# Patient Record
Sex: MALE | Race: WHITE | Employment: FULL TIME | URBAN - METROPOLITAN AREA
[De-identification: names, ages, dates, MRNs, and addresses within clinical notes are randomized per-mention and may not be internally consistent; named-entity substitution may affect disease eponyms.]

---

## 2020-11-16 ENCOUNTER — TELEPHONE (OUTPATIENT)
Dept: FAMILY MEDICINE CLINIC | Facility: CLINIC | Age: 65
End: 2020-11-16

## 2020-11-16 ENCOUNTER — HOSPITAL ENCOUNTER (OUTPATIENT)
Age: 65
Discharge: HOME | End: 2020-11-16
Attending: EMERGENCY MEDICINE
Payer: COMMERCIAL

## 2020-11-16 VITALS
TEMPERATURE: 99 F | SYSTOLIC BLOOD PRESSURE: 112 MMHG | OXYGEN SATURATION: 97 % | DIASTOLIC BLOOD PRESSURE: 61 MMHG | RESPIRATION RATE: 22 BRPM | HEART RATE: 77 BPM

## 2020-11-16 DIAGNOSIS — J06.9 UPPER RESPIRATORY TRACT INFECTION, UNSPECIFIED TYPE: Primary | ICD-10-CM

## 2020-11-16 PROCEDURE — 99203 OFFICE O/P NEW LOW 30 MIN: CPT

## 2020-11-16 NOTE — ED INITIAL ASSESSMENT (HPI)
Pt here w/ c/o pain to throat when he takes a deep breath and makes him cough. Slight dry cough noted.  Onset 4-5 days

## 2020-11-16 NOTE — ED PROVIDER NOTES
Patient Seen in: [facility]      History   Patient presents with:  Cough/URI    Stated Complaint: sent from his dr/ feels like something is stuck in his throat and it makes him *    HPI    Patient is a 49-year-old male who states for the Temp src Temporal   SpO2 97 %   O2 Device None (Room air)       Current:/61   Pulse 77   Temp 99.2 °F (37.3 °C) (Temporal)   Resp 22   SpO2 97%         Physical Exam  GENERAL: Patient resting comfortably on the cart in no acute distress.   HEENT: Ex

## 2020-11-16 NOTE — TELEPHONE ENCOUNTER
Per Keiko Lewis, please triage. We do not know what patients symptoms are and if he is c/o any SOB, he needs to go to IC. If not, OK for virtual visit this afternoon.

## 2020-11-16 NOTE — TELEPHONE ENCOUNTER
I called patient and made him aware of Kadeem Simpson advise to to to [facility]. He asked that I send the address via [employer] to the [facility] location-->sent address via [employer]. He is going now to the [facility].  Patient verbalized

## 2020-11-16 NOTE — TELEPHONE ENCOUNTER
Patient came into office for physical- informed the  staff he was having COVID symptoms and his visit was changed to a virtual visit.  I used language lines solutions  Arnulfo Peña #[id number] and called his home phone, cell phone and emerge

## 2020-11-16 NOTE — TELEPHONE ENCOUNTER
Given his symptoms and stating \"when he takes deep breaths in he feel like his throat closes like if something is stuck\" I would like him to go to  [facility] for an evaluation. We can have him establish care/follow up thereafter.

## 2020-11-16 NOTE — TELEPHONE ENCOUNTER
I spoke with patient (Uzbek) and he said he presented in clinic for an evaluation of his shoulder, after he was screened for Covid symptoms he was told he would have a virtual visit.     Date of ONSET: 4 days ago 11/12/2020    Since onset/CURRENTLY:    Co

## 2020-11-18 ENCOUNTER — TELEPHONE (OUTPATIENT)
Dept: FAMILY MEDICINE CLINIC | Facility: CLINIC | Age: 65
End: 2020-11-18

## 2020-11-18 NOTE — TELEPHONE ENCOUNTER
Per Tacos Sargent: Pt will be establishing care in our office--he came into the office to see Cordell on 11/16/2020 but was sent back to his car as he had sick symptoms. He was triaged by our office and sent to [facility] due to his symptoms. He has tested positive for COVID. Please add him to the [facility] call list and schedule virtual appointment with Cordell 11/23/2020. To ER if any new/worsening symptoms.

## 2020-11-19 NOTE — TELEPHONE ENCOUNTER
65 nay DO requested call to pt's spouse/paolo today for condition update. sts An Maddox speaks [address] well. Call to pt's home reaches identified voice mail. Since no hipaa consent seen in record, left [facility] req call back tomorrow w update. Provided phone hours.

## 2020-11-23 ENCOUNTER — TELEMEDICINE (OUTPATIENT)
Dept: FAMILY MEDICINE CLINIC | Facility: CLINIC | Age: 65
End: 2020-11-23
Payer: COMMERCIAL

## 2020-11-23 ENCOUNTER — APPOINTMENT (OUTPATIENT)
Dept: GENERAL RADIOLOGY | Facility: HOSPITAL | Age: 65
DRG: 177 | End: 2020-11-23
Attending: EMERGENCY MEDICINE
Payer: COMMERCIAL

## 2020-11-23 ENCOUNTER — HOSPITAL ENCOUNTER (INPATIENT)
Facility: HOSPITAL | Age: 65
LOS: 3 days | Discharge: HOME | DRG: 177 | End: 2020-11-26
Attending: EMERGENCY MEDICINE | Admitting: INTERNAL MEDICINE
Payer: COMMERCIAL

## 2020-11-23 ENCOUNTER — TELEPHONE (OUTPATIENT)
Dept: FAMILY MEDICINE CLINIC | Facility: CLINIC | Age: 65
End: 2020-11-23

## 2020-11-23 DIAGNOSIS — R09.02 OXYGEN DESATURATION: ICD-10-CM

## 2020-11-23 DIAGNOSIS — R09.02 HYPOXIA: ICD-10-CM

## 2020-11-23 DIAGNOSIS — R05.9 COUGH: ICD-10-CM

## 2020-11-23 DIAGNOSIS — U07.1 COVID-19 VIRUS INFECTION: Primary | ICD-10-CM

## 2020-11-23 DIAGNOSIS — R50.9 FEVER, UNSPECIFIED FEVER CAUSE: ICD-10-CM

## 2020-11-23 PROBLEM — E87.6 HYPOKALEMIA: Status: ACTIVE | Noted: 2020-11-23

## 2020-11-23 PROCEDURE — 71045 X-RAY EXAM CHEST 1 VIEW: CPT | Performed by: EMERGENCY MEDICINE

## 2020-11-23 PROCEDURE — 3E0333Z INTRODUCTION OF ANTI-INFLAMMATORY INTO PERIPHERAL VEIN, PERCUTANEOUS APPROACH: ICD-10-PCS | Performed by: HOSPITALIST

## 2020-11-23 PROCEDURE — 99223 1ST HOSP IP/OBS HIGH 75: CPT | Performed by: HOSPITALIST

## 2020-11-23 PROCEDURE — 99214 OFFICE O/P EST MOD 30 MIN: CPT | Performed by: NURSE PRACTITIONER

## 2020-11-23 RX ORDER — ACETAMINOPHEN 325 MG/1
325 TABLET ORAL EVERY 6 HOURS PRN
COMMUNITY
End: 2020-11-27

## 2020-11-23 RX ORDER — ONDANSETRON 2 MG/ML
4 INJECTION INTRAMUSCULAR; INTRAVENOUS EVERY 6 HOURS PRN
Status: DISCONTINUED | OUTPATIENT
Start: 2020-11-23 | End: 2020-11-26

## 2020-11-23 RX ORDER — ACETAMINOPHEN 325 MG/1
650 TABLET ORAL EVERY 6 HOURS PRN
Status: DISCONTINUED | OUTPATIENT
Start: 2020-11-23 | End: 2020-11-26

## 2020-11-23 RX ORDER — ENOXAPARIN SODIUM 100 MG/ML
40 INJECTION SUBCUTANEOUS DAILY
Status: DISCONTINUED | OUTPATIENT
Start: 2020-11-24 | End: 2020-11-26

## 2020-11-23 RX ORDER — DEXAMETHASONE SODIUM PHOSPHATE 4 MG/ML
6 VIAL (ML) INJECTION ONCE
Status: COMPLETED | OUTPATIENT
Start: 2020-11-23 | End: 2020-11-23

## 2020-11-23 NOTE — CONSULTS
INFECTIOUS [facility] [address]. Patient Status:  Emergency    1955 MRN [id number]   Location [address] Attending No att. providers found   Hosp Day # 0 [18-25] 25  BP: ()/(54-74) 107/74  HEENT: Moist mucous membranes. Extraocular muscles are intact. Neck: No lymphadenopathy. supple, no masses  Respiratory: non labored  Abdomen: Soft, nontender, nondistended.     Musculoskeletal: Full range of motion respiratory failure, but has mild hypoxia  No objection to brief course of steroids          Alexa Khan MD  [facility]  [phone]

## 2020-11-23 NOTE — PROGRESS NOTES
Subjective     HPI:   Rosa Pa verbally consents to a Virtual/Telephone Check-In service on 11/23/20. Patient understands and accepts financial responsibility for any deductible, co-insurance and/or co-pays associated with this service.  This visit is emergency and because of restrictions of visitation. There are limitations of this visit as a limited physical exam could be performed. Every conscious effort was taken to allow for sufficient and adequate time.  Time was spent on reviewing medications and

## 2020-11-23 NOTE — H&P
[facility] H[facility]PITAL[facility]  History and Physical     Beth White Patient Status:  Emergency    1955 MRN [id number]   Location [address] Attending No att. providers found   Hosp Day # 0 PCP Ratna Chavez MD     Chief Comp atraumatic. Moist mucous membranes. EOM-I. PERRLA. Anicteric. Neck: No lymphadenopathy. No JVD. No carotid bruits. Respiratory: Clear to auscultation bilaterally. No wheezes. No rhonchi. Cardiovascular: S1, S2. Regular rate and rhythm.  No murmurs, rubs

## 2020-11-23 NOTE — ED PROVIDER NOTES
Patient Seen in: [facility]      History   Patient presents with:  Covid  Difficulty Breathing    Stated Complaint: covid pos low o2 sat and fever    HPI    57-year-old male comes to the hospital complaint of having difficulty w no JVD, trachea midline, No LAD  Heart: S1S2 normal. No murmurs, regular rate and rhythm  Lungs: Clear to auscultation bilaterally, increase expiratory rate noted  Abdomen: Soft nontender nondistended normal active bowel sounds without rebound, guarding or WITH DIFFERENTIAL WITH PLATELET.   Procedure                               Abnormality         Status                     ---------                               -----------         ------                     CBC W/ DIFFERENTIAL[[id number]]          Abnormal involving complex record review, complex medical decisions and interventions, and consultations outside the regular procedures and care normally rendered for greater then 35 minutes of critical care time                Disposition and Plan     Clinical Imp

## 2020-11-23 NOTE — ED NOTES
Patient's SPO2 is 85% on room air and he has labored breathing that is fast. Patient is placed on nasal cannula at 3 lpm and breathing effort has eased.  Spo2 is now 94%

## 2020-11-23 NOTE — TELEPHONE ENCOUNTER
1254 per nay NP-had doximity visit w pt today. Pt now has pulse ox and thermometer. Reporting O2 sats in the 80s and temp 102-advised to proceed to ER now. Requests call to ER advsing of this info.   Call to thang DIAZ-spoke edis palm RN-advised pt shahana

## 2020-11-24 PROCEDURE — 99232 SBSQ HOSP IP/OBS MODERATE 35: CPT | Performed by: HOSPITALIST

## 2020-11-24 RX ORDER — MAGNESIUM HYDROXIDE/ALUMINUM HYDROXICE/SIMETHICONE 120; 1200; 1200 MG/30ML; MG/30ML; MG/30ML
30 SUSPENSION ORAL 4 TIMES DAILY PRN
Status: DISCONTINUED | OUTPATIENT
Start: 2020-11-24 | End: 2020-11-26

## 2020-11-24 NOTE — PROGRESS NOTES
Pt just arrived from ER, alert and oriented,  was used to communicate with pt. Denies pain. 02 3L NC. RA 85%,  Decadron po.  Will monitor pt

## 2020-11-24 NOTE — PROGRESS NOTES
Pt is alert and oriented x4, mainly Slovak speaking, pt uses  jace on his phone to help with communication. IS 02 3 L NC, . Tele NSR. VSS, decadron po, up self. Voids. Droplet and contact isolation in place. Safety measures in place.  Tommie zazueta

## 2020-11-24 NOTE — PAYOR COMM NOTE
--------------  ADMISSION REVIEW     Payor: ALFRED [employer]  Subscriber #:  [id number]  Authorization Number: [id number]    Admit date: 11/23/20  Admit time: 1811       Patient Seen in: [facility] Emergency Department    History   Stated Complaint: covid p Soft nontender nondistended normal active bowel sounds without rebound, guarding or masses noted  Back nontender without CVA tenderness  Extremity no clubbing, cyanosis or edema noted.   Full range of motion noted without tenderness  Neuro: No focal deficit further care for his Covid infection with hypoxia    Disposition and Plan     Clinical Impression:  COVID-19 virus infection  (primary encounter diagnosis)  Hypoxia        [facility]  History and Physical     History of Present Illness: Tia Armenta 11/16  -shortness of breath, mild hypoxia 02 sat 91% RA, on 2L     Not likely to get significant benefit from Remdesivir at 14 days of symptoms and only mild hypoxia     No signs of respiratory failure, but has mild hypoxia  No objection to brief course of (Left Lower Abdomen) Ze Price, RN      potassium chloride 40 mEq in sodium chloride 0.9% 250 mL IVPB     Date Action Dose Route User    11/23/2020 1955 New Bag 40 mEq Intravenous Fabienne Acuña RN      sodium chloride 0.9% IV bolus 1,000 mL     Da

## 2020-11-24 NOTE — PROGRESS NOTES
[facility]ITAL[facility]  Progress Note     Beto [place] Patient Status:  Inpatient    1955 MRN [id number]   [facility] 5NW-A Attending Amaya Reynaga MD   Hosp Day # 1 PCP Jayne Castañeda MD     Chief Complaint: SOB    S: Patient seen Imaging data reviewed in Epic. Medications:   • enoxaparin  40 mg Subcutaneous Daily   • dexamethasone  6 mg Oral Daily       ASSESSMENT / PLAN:     1. COVID-19 pneumonia-patient on day 14 of onset of symptoms.   We will continue on Decadron D2 with te

## 2020-11-24 NOTE — COVID NURSING ASSESSMENT
COVID-19 Daily Discharge Readiness-Nursing    Temperature max from last 24 hrs: Temp (24hrs), Av.8 °F (37.1 °C), Min:98.5 °F (36.9 °C), Max:99 °F (37.2 °C)    Inflammatory Markers:   Recent Labs   Lab 20  1443   CRP 17.30*   AURA 2,203.0*   DDIMER

## 2020-11-24 NOTE — PROGRESS NOTES
[address] [place] [address] Patient Status:  Inpatient    1955 MRN [id number]   [facility] 5NW-A Attending Candace Camargo MD   [facility] COVID-19 virus infection     Hypoxia     Hypokalemia        ASSESSMENT/PLAN:  1.  COVID-19 pneumonia day #15  Onset of symptoms 11/9, and tested positive on 11/16  -shortness of breath, mild hypoxia 02 sat 91% RA, on 2L     Not likely to get significant boogie

## 2020-11-25 PROCEDURE — 99232 SBSQ HOSP IP/OBS MODERATE 35: CPT | Performed by: HOSPITALIST

## 2020-11-25 NOTE — PLAN OF CARE
COVID-19 Daily Discharge Readiness-Nursing    O2 Sat at Rest:   95  %   O2 Sat with Exertion:    % on    liters   Temperature max from last 24 hrs: Temp (24hrs), Av °F (36.7 °C), Min:97.7 °F (36.5 °C), Max:98.5 °F (36.9 °C)    Inflammatory Markers:   R non-pharmacological measures as appropriate and evaluate response  - Consider cultural and social influences on pain and pain management  - Manage/alleviate anxiety  - Utilize distraction and/or relaxation techniques  - Monitor for opioid side effects  - N Refer to Case Management Department for coordinating discharge planning if the patient needs post-hospital services based on physician/LIP order or complex needs related to functional status, cognitive ability or social support system  Outcome: Progressing

## 2020-11-25 NOTE — PROGRESS NOTES
Pt is a 71 y/o male admitted with acute resp failure due to covid 19 infection. alert oriented. Beninese speaking. placed him on room air.02 sats>95%. up as tolerated. encouraged IS and self proning. inflmatory markers trending up but CRP down. pending  02 walk. no

## 2020-11-25 NOTE — PROGRESS NOTES
11/24/20 1934   Provider Notification   Reason for Communication Patient request   Provider Name Other (comment)  Lori Asim)   Method of Communication Page   Response Waiting for response   Notification Time 1935   MD ordered Maalox.

## 2020-11-25 NOTE — PROGRESS NOTES
[address] [place] [address] Patient Status:  Inpatient    1955 MRN [id number]   [facility] 5NW-A Attending Ibrahima Olmedo MD   [facility]osp Day # 2 [facility]gastroes[facility] disease)     Pterygium eye, bilateral     Nuclear sclerotic cataract of both eyes     COVID-19 virus infection     Hypoxia     Hypokalemia        ASSESSMENT/PLAN:  1.  COVID-19 pneumonia day #16  Onset of symptoms 11/9, and tested p

## 2020-11-25 NOTE — PROGRESS NOTES
[facility]  Progress Note     Dyana Tonia Patient Status:  Inpatient    1955 MRN [id number]   [facility] 5NW-A Attending Tobias Cummins MD   Hosp Day # 2 PCP Vladimir Shaikh MD     Chief Complaint: SOB    S:no complaints, Results   Component Value Date    PCT 0.13 11/23/2020        Troponin  Lab Results   Component Value Date    TROP <0.045 11/23/2020    TROP <0.045 11/23/2020    TROP <0.045 11/23/2020       Pro-BNP  Lab Results   Component Value Date    PBNP 127 (H) 11/23/

## 2020-11-25 NOTE — PAYOR COMM NOTE
--------------  11/25 CONTINUED STAY REVIEW    Payor: [employer]FRED [employer]  Subscriber #:  [id number]  Authorization Number: [id number]       HOSPITALIST:    S: no complaints, feeling better      Vital signs:  Temp:  [97.7 °F (36.5 °C)-98.5 °F (36.9 °C)] 98 °F (36.7 suspension 30 mL     Date Action Dose Route User    11/24/2020 1954 Given 30 mL Oral Xiang Harding, RN      Enoxaparin Sodium (LOVENOX) 40 MG/0.4ML injection 40 mg     Date Action Dose Route User    11/24/2020 0820 Given 40 mg Subcutaneous (Left Lower

## 2020-11-26 VITALS
BODY MASS INDEX: 26.26 KG/M2 | RESPIRATION RATE: 16 BRPM | WEIGHT: 167.31 LBS | SYSTOLIC BLOOD PRESSURE: 103 MMHG | HEIGHT: 67 IN | TEMPERATURE: 98 F | DIASTOLIC BLOOD PRESSURE: 66 MMHG | HEART RATE: 54 BPM | OXYGEN SATURATION: 92 %

## 2020-11-26 PROCEDURE — 99239 HOSP IP/OBS DSCHRG MGMT >30: CPT | Performed by: HOSPITALIST

## 2020-11-26 RX ORDER — DEXAMETHASONE 6 MG/1
6 TABLET ORAL DAILY
Qty: 2 TABLET | Refills: 0 | Status: SHIPPED | OUTPATIENT
Start: 2020-11-27 | End: 2020-11-30

## 2020-11-26 NOTE — PROGRESS NOTES
[facility]ITAL[facility]  Progress Note     Patricia Sophie Patient Status:  Inpatient    1955 MRN [id number]   [facility] 5NW-A Attending Brijesh Hampton MD   Hosp Day # 3 PCP Elizabet Navarro MD     Chief Complaint: SOB    S:no complaints, Results   Component Value Date    PCT 0.13 11/23/2020        Troponin  Lab Results   Component Value Date    TROP <0.045 11/23/2020    TROP <0.045 11/23/2020    TROP <0.045 11/23/2020       Pro-BNP  Lab Results   Component Value Date    PBNP 127 (H) 11/23/

## 2020-11-26 NOTE — PROGRESS NOTES
NURSING DISCHARGE NOTE    Discharged Home via Wheelchair. Accompanied by Support staff  Belongings Taken by patient/family   IV d/c. D/C paperwork given to pt and discussed with pt- verbalized understanding.  Updated pt's daughters on d/c orders- saray

## 2020-11-26 NOTE — PLAN OF CARE
COVID-19 Daily Discharge Readiness-Nursing    O2 Sat at Rest:    95 %   O2 Sat with Exertion:    90 % on    0.5 liters   Temperature max from last 24 hrs: Temp (24hrs), Av.1 °F (36.7 °C), Min:97.8 °F (36.6 °C), Max:98.5 °F (36.9 °C)    Inflammatory Mar appropriate and evaluate response  - Consider cultural and social influences on pain and pain management  - Manage/alleviate anxiety  - Utilize distraction and/or relaxation techniques  - Monitor for opioid side effects  - Notify MD/LIP if interventions un for coordinating discharge planning if the patient needs post-hospital services based on physician/LIP order or complex needs related to functional status, cognitive ability or social support system  Outcome: Progressing     Problem: RESPIRATORY - ADULT  G

## 2020-11-27 ENCOUNTER — TELEPHONE (OUTPATIENT)
Dept: FAMILY MEDICINE CLINIC | Facility: CLINIC | Age: 65
End: 2020-11-27

## 2020-11-27 ENCOUNTER — HOSPITAL ENCOUNTER (INPATIENT)
Facility: HOSPITAL | Age: 65
LOS: 1 days | Discharge: HOME | DRG: 177 | End: 2020-11-28
Attending: EMERGENCY MEDICINE | Admitting: HOSPITALIST
Payer: COMMERCIAL

## 2020-11-27 ENCOUNTER — APPOINTMENT (OUTPATIENT)
Dept: CT IMAGING | Facility: HOSPITAL | Age: 65
DRG: 177 | End: 2020-11-27
Attending: HOSPITALIST
Payer: COMMERCIAL

## 2020-11-27 ENCOUNTER — APPOINTMENT (OUTPATIENT)
Dept: GENERAL RADIOLOGY | Facility: HOSPITAL | Age: 65
DRG: 177 | End: 2020-11-27
Attending: EMERGENCY MEDICINE
Payer: COMMERCIAL

## 2020-11-27 DIAGNOSIS — U07.1 COVID-19 VIRUS INFECTION: ICD-10-CM

## 2020-11-27 DIAGNOSIS — R09.02 HYPOXIA: Primary | ICD-10-CM

## 2020-11-27 PROCEDURE — 71045 X-RAY EXAM CHEST 1 VIEW: CPT | Performed by: EMERGENCY MEDICINE

## 2020-11-27 PROCEDURE — 3E0333Z INTRODUCTION OF ANTI-INFLAMMATORY INTO PERIPHERAL VEIN, PERCUTANEOUS APPROACH: ICD-10-PCS | Performed by: HOSPITALIST

## 2020-11-27 PROCEDURE — 71275 CT ANGIOGRAPHY CHEST: CPT | Performed by: HOSPITALIST

## 2020-11-27 PROCEDURE — 99223 1ST HOSP IP/OBS HIGH 75: CPT | Performed by: HOSPITALIST

## 2020-11-27 RX ORDER — DEXAMETHASONE SODIUM PHOSPHATE 4 MG/ML
6 VIAL (ML) INJECTION EVERY 24 HOURS
Status: DISCONTINUED | OUTPATIENT
Start: 2020-11-27 | End: 2020-11-28

## 2020-11-27 RX ORDER — SIMETHICONE 80 MG
80 TABLET,CHEWABLE ORAL 4 TIMES DAILY PRN
Status: DISCONTINUED | OUTPATIENT
Start: 2020-11-27 | End: 2020-11-28

## 2020-11-27 RX ORDER — MELATONIN
3 NIGHTLY PRN
Status: DISCONTINUED | OUTPATIENT
Start: 2020-11-27 | End: 2020-11-28

## 2020-11-27 RX ORDER — HYDROCODONE BITARTRATE AND ACETAMINOPHEN 5; 325 MG/1; MG/1
1 TABLET ORAL EVERY 4 HOURS PRN
Status: DISCONTINUED | OUTPATIENT
Start: 2020-11-27 | End: 2020-11-28

## 2020-11-27 RX ORDER — MAGNESIUM HYDROXIDE/ALUMINUM HYDROXICE/SIMETHICONE 120; 1200; 1200 MG/30ML; MG/30ML; MG/30ML
30 SUSPENSION ORAL 4 TIMES DAILY PRN
Status: DISCONTINUED | OUTPATIENT
Start: 2020-11-27 | End: 2020-11-28

## 2020-11-27 RX ORDER — ALBUTEROL SULFATE 90 UG/1
8 AEROSOL, METERED RESPIRATORY (INHALATION) 4 TIMES DAILY
Status: DISCONTINUED | OUTPATIENT
Start: 2020-11-27 | End: 2020-11-28

## 2020-11-27 RX ORDER — CALCIUM CARBONATE 200(500)MG
500 TABLET,CHEWABLE ORAL 3 TIMES DAILY PRN
Status: DISCONTINUED | OUTPATIENT
Start: 2020-11-27 | End: 2020-11-28

## 2020-11-27 RX ORDER — ECHINACEA PURPUREA EXTRACT 125 MG
1 TABLET ORAL
Status: DISCONTINUED | OUTPATIENT
Start: 2020-11-27 | End: 2020-11-28

## 2020-11-27 RX ORDER — TRAZODONE HYDROCHLORIDE 50 MG/1
50 TABLET ORAL NIGHTLY PRN
Status: DISCONTINUED | OUTPATIENT
Start: 2020-11-27 | End: 2020-11-28

## 2020-11-27 RX ORDER — SENNOSIDES 8.6 MG
8.6 TABLET ORAL 2 TIMES DAILY PRN
Status: DISCONTINUED | OUTPATIENT
Start: 2020-11-27 | End: 2020-11-28

## 2020-11-27 RX ORDER — ENOXAPARIN SODIUM 100 MG/ML
40 INJECTION SUBCUTANEOUS EVERY EVENING
Status: DISCONTINUED | OUTPATIENT
Start: 2020-11-27 | End: 2020-11-28

## 2020-11-27 RX ORDER — HYDROCODONE BITARTRATE AND ACETAMINOPHEN 5; 325 MG/1; MG/1
2 TABLET ORAL EVERY 4 HOURS PRN
Status: DISCONTINUED | OUTPATIENT
Start: 2020-11-27 | End: 2020-11-28

## 2020-11-27 RX ORDER — BENZONATATE 100 MG/1
100 CAPSULE ORAL 3 TIMES DAILY PRN
Status: DISCONTINUED | OUTPATIENT
Start: 2020-11-27 | End: 2020-11-28

## 2020-11-27 RX ORDER — ACETAMINOPHEN 325 MG/1
650 TABLET ORAL EVERY 6 HOURS PRN
Status: DISCONTINUED | OUTPATIENT
Start: 2020-11-27 | End: 2020-11-28

## 2020-11-27 RX ORDER — ONDANSETRON 2 MG/ML
8 INJECTION INTRAMUSCULAR; INTRAVENOUS EVERY 6 HOURS PRN
Status: DISCONTINUED | OUTPATIENT
Start: 2020-11-27 | End: 2020-11-28

## 2020-11-27 RX ORDER — POLYETHYLENE GLYCOL 3350 17 G/17G
17 POWDER, FOR SOLUTION ORAL DAILY PRN
Status: DISCONTINUED | OUTPATIENT
Start: 2020-11-27 | End: 2020-11-28

## 2020-11-27 RX ORDER — BISACODYL 10 MG
10 SUPPOSITORY, RECTAL RECTAL
Status: DISCONTINUED | OUTPATIENT
Start: 2020-11-27 | End: 2020-11-28

## 2020-11-27 NOTE — DISCHARGE SUMMARY
[facility]OSPITAL[facility]  DISCHARGE SUMMARY     Neftali Zavala Patient Status:  Inpatient    1955 MRN [id number]   [facility] 5NW-A Attending No att. providers found   Hosp Day # 3 PCP Caridad Mullins MD     Date of Admission: 2020 [place] [address]    Phone: [phone]   · dexamethasone 6 MG Tabs         [place]    Follow-up appointment:   MD Liz Astudillo 66 [address] [facility] [phone]    In 1 week      Appointments for Next 30

## 2020-11-27 NOTE — PAYOR COMM NOTE
--------------  DISCHARGE REVIEW    Payor: [facility]FR[facility]  Subscriber #:  [id number]  Authorization Number: [id number]    Admit date: 11/23/20  Admit time:  1811  Discharge Date: 11/26/2020  2:07 PM     Admitting Physician: Anoop Barragan MD  Attending Physici   CRP 14.60 (H) 11/24/2020         Ferritin        Lab Results   Component Value Date     AURA 2,255.6 (H) 11/26/2020     AURA 3,337.6 (H) 11/25/2020     AURA 2,039.1 (H) 11/24/2020         CPK        Lab Results   Component Value Date      11/23/2020

## 2020-11-27 NOTE — TELEPHONE ENCOUNTER
COVID + on: 11/16/2020    Date of ONSET: about 11/11-11/12  (see chart review-->notes--> 11/16/2020)    Patient was [place] [facility] [place] and discharged 11/26/2020    CURRENTLY:    Cough: \"only when speaking a lot\".     SOB: with speaking, can a

## 2020-11-27 NOTE — H&P
[facility]ITAL[facility]  History and Physical     Jessenia Blackman Patient Status:  Emergency    1955 MRN [id number]   [address] Attending Arabella Severs, MD   [facility] Day # 0 PCP Hua Posada MD     Chief Complai Pulse 72   Temp 97 °F (36.1 °C) (Temporal)   Resp 18   Ht 5' 7\" (1.702 m)   Wt 174 lb (78.9 kg)   SpO2 95%   BMI 27.25 kg/m²   General: No acute distress. Alert and oriented x 3. HEENT: Normocephalic atraumatic. Moist mucous membranes. EOM-I. PERRLA.  Ani protocol; low threshold for ID or pulm  Will do med rec once review complete  Quality:  · DVT Prophylaxis: lovenox  · CODE status: full  · Paula: no  · If COVID testing is negative, may discontinue isolation: n/a     Plan of care discussed with patient and

## 2020-11-27 NOTE — TELEPHONE ENCOUNTER
Per nay NP-[facility] admit 11/23 covid+ 11/16. Requests covid home monitoring calls-starting today. **pt speaks Iraqi**  Per hosp record-admit 11/23/2020, discharge 11/26/2020  ASSESSMENT / PLAN:   1.  COVID-19 pneumonia  1. Decadron D4  2.  Wean

## 2020-11-27 NOTE — ED INITIAL ASSESSMENT (HPI)
A/o x3, Haitian speaking, states the wellness nurse called and told him to come to ER because he was sounding short of breath when speaking, pt states he feels a little of SENTHIL on exertion. Lightheaded when walking also.  Was recently admitted for covid-19

## 2020-11-27 NOTE — ED PROVIDER NOTES
Patient Seen in: [facility] Emergency Department      History   Patient presents with:  Covid  Difficulty Breathing    Stated Complaint: covid +, low o2, 90% at triage     HPI    Patient Covid positive, was admitted here yesterday, went home after m no stridor  Cardiovascular: Regular rate and rhythm, normal S1 and S2, no murmurs, rubs, or gallops   Lungs: Clear to auscultation bilaterally with equal breath sounds, no wheezing, no rhonchi   Abdomen: Positive bowel sounds,  soft, no tenderness  Extremi GLUC,           Resulted by: [id number]: FERR,    CK CREATINE KINASE (NOT CREATININE) - Normal   PROCALCITONIN - Normal    Narrative:     Resulted by: batch: CTNI,    CBC WITH DIFFERENTIAL WITH PLATELET    Narrative:      The following orders were created for p 11/23/2020          ICD-10-CM Noted POA    * (Principal) Hypoxia R09.02 11/23/2020 Unknown    COVID-19 U07.1 11/23/2020 Unknown    COVID-19 virus infection U07.1 11/27/2020

## 2020-11-27 NOTE — RESPIRATORY THERAPY NOTE
PATIENT TO RECEIVE N8 PUFFS ALBUTEROL VIA AEROCHAMBER. SAO2 KEEPS FLUCTUATING  BETWEEN 84-91% WITH GOOD WAVE FORM. POST TREATMENT LEFT AND CHECKED ON PATIENT IN 5 MINUTES AND SAO2 87%  PLACED ON 3LPM NASAL CANNULA.   INFORMED RN AND MD.  SAO2 92%-93%

## 2020-11-27 NOTE — PROGRESS NOTES
Patient readmitted for coronavirus related pneumonia and hypoxia. Check CTA of the chest and procalcitonin. Restart Decadron 6 mg daily through the IV. Covid protocol. Full note to follow patient seen and examined in the emergency room.

## 2020-11-28 VITALS
RESPIRATION RATE: 15 BRPM | OXYGEN SATURATION: 96 % | HEART RATE: 74 BPM | HEIGHT: 67 IN | TEMPERATURE: 98 F | BODY MASS INDEX: 27.31 KG/M2 | SYSTOLIC BLOOD PRESSURE: 97 MMHG | DIASTOLIC BLOOD PRESSURE: 64 MMHG | WEIGHT: 174 LBS

## 2020-11-28 PROCEDURE — 99238 HOSP IP/OBS DSCHRG MGMT 30/<: CPT | Performed by: HOSPITALIST

## 2020-11-28 NOTE — PROGRESS NOTES
[facility] [address]  [place], [address]  ?  11/28/20  ? Re: Suze Ferro  ? To Whom It May Concern:    Suze Ferro was admitted to [facility] from 11/27/2020 to 11/28/20.     Please excuse Suze Pillo from attending wor

## 2020-11-28 NOTE — PROGRESS NOTES
[facility]ITAL[facility]  Progress note     Saundra Prescott Patient Status:  Emergency    1955 MRN [id number]   Location [address] [facility] Attending Sushila Diaz MD   [facility] Day # 1 PCP Nicole Blizzard, MD     Chief Complaint: hyp <0.045     --   --  88  --     < > = values in this interval not displayed. Imaging: Imaging data reviewed in Epic. ASSESSMENT / PLAN:     1.  Acute hypoxic resp failure due to covid19 pneumonia-continue decadron; d/c today if can arrange h

## 2020-11-28 NOTE — DISCHARGE SUMMARY
[facility] HOSPITALIST  DISCHARGE SUMMARY     Dawn Yuan Patient Status:  Inpatient    1955 MRN [id number]   [facility] 5NW-A Attending Dave Lindsay MD   [facility] Day # 1 PCP Randy Bal MD     Date of Admission: 2020  Date medications      Instructions Prescription details   dexamethasone 6 MG Tabs  Commonly known as: DECADRON      Take 1 tablet (6 mg total) by mouth daily.    Quantity: 2 tablet  Refills: 0            [place]    Follow-up appointment:   Mark Carr,

## 2020-11-28 NOTE — PROGRESS NOTES
11/28/20 1011   Mobility   O2 walk?  Yes   SPO2 on Room Air at Rest 92   SPO2 Ambulation on Room Air 88   SPO2 Ambulation on Oxygen 90   Ambulation oxygen flow (liters per minute) 1   Dyspnea on exertion

## 2020-11-28 NOTE — PLAN OF CARE
COVID-19 Daily Discharge Readiness-Nursing    Temperature max from last 24 hrs: Temp (24hrs), Av.7 °F (36.5 °C), Min:97 °F (36.1 °C), Max:98.4 °F (36.9 °C)  Problem: +COVID, recently D/C on    Data: pt came back to us with dyspnea. A&Ox4.   was including cough, deep breathe, Incentive Spirometry  - Assess the need for suctioning and perform as needed  - Assess and instruct to report SOB or any respiratory difficulty  - Respiratory Therapy support as indicated  - Manage/alleviate anxiety  - Monito

## 2020-11-28 NOTE — PROGRESS NOTES
[address] [place] [address] Patient Status:  Inpatient    1955 MRN [id number]   [facility] 5NW-A Attending Zunilda Sal MD   Hosp Day # 1 PCP MD Vivi Koromar 7.6  --            Problem list reviewed:  Patient Active Problem List:     GERD (gastroesophageal reflux disease)     Pterygium eye, bilateral     Nuclear sclerotic cataract of both eyes     COVID-19     Hypoxia     Hypokalemia     COVID-19 virus infectio

## 2020-11-28 NOTE — CM/SW NOTE
RN updated MSW that pt will dc today and will need home o2. MSW called pt's room using language line ( ID # [id number]) and pt is agreeable to home 02. No hx of Home 02 or requested provider. MSW sent referral in [address].         11am  MSW spoke to [employer]

## 2020-11-28 NOTE — PROGRESS NOTES
Pt admitted from ER, placed on tele, oriented to the unit. Patient Mongolian speaking.  services utilized. Admission navigator and admission assessment completed.  Pt A&O x 4, SPO2 97% on 3 L oxygen, sinus rhythm on tele, up with standby assist. Sc

## 2020-11-28 NOTE — CONSULTS
[address] [place] [address] Patient Status:  Inpatient    1955 MRN [id number]   [facility] 5NW-A Attending Adam Edmonds MD   [facility] Day [facility]     Pterygium eye, bilateral     Nuclear sclerotic cataract of both eyes     COVID-19     Hypoxia     Hypokalemia     COVID-19 virus infection        ASSESSMENT/PLAN:  1.  COVID-19 pneumonia day #18  Onset of symptoms 11/9,

## 2020-11-28 NOTE — PROGRESS NOTES
NURSING DISCHARGE NOTE    Discharged Home via Wheelchair. Accompanied by Support staff  Belongings Taken by patient/family. IV d/c. Patient given AVS and discussed f/u orders- verbalized understanding.  Gave instructions on home O2 tank- verbalized un

## 2020-11-30 ENCOUNTER — PATIENT OUTREACH (OUTPATIENT)
Dept: CASE MANAGEMENT | Age: 65
End: 2020-11-30

## 2020-11-30 ENCOUNTER — TELEMEDICINE (OUTPATIENT)
Dept: FAMILY MEDICINE CLINIC | Facility: CLINIC | Age: 65
End: 2020-11-30
Payer: COMMERCIAL

## 2020-11-30 ENCOUNTER — PATIENT MESSAGE (OUTPATIENT)
Dept: FAMILY MEDICINE CLINIC | Facility: CLINIC | Age: 65
End: 2020-11-30

## 2020-11-30 DIAGNOSIS — R73.09 ELEVATED GLUCOSE: ICD-10-CM

## 2020-11-30 DIAGNOSIS — D72.829 LEUKOCYTOSIS, UNSPECIFIED TYPE: ICD-10-CM

## 2020-11-30 DIAGNOSIS — E83.41 HYPERMAGNESEMIA: ICD-10-CM

## 2020-11-30 DIAGNOSIS — Z02.9 ENCOUNTERS FOR UNSPECIFIED ADMINISTRATIVE PURPOSE: ICD-10-CM

## 2020-11-30 DIAGNOSIS — Z01.812 ENCOUNTER FOR PREPROCEDURE SCREENING LABORATORY TESTING FOR COVID-19: ICD-10-CM

## 2020-11-30 DIAGNOSIS — U07.1 COVID-19 VIRUS INFECTION: Primary | ICD-10-CM

## 2020-11-30 DIAGNOSIS — R94.31 ABNORMAL EKG: ICD-10-CM

## 2020-11-30 DIAGNOSIS — Z20.822 ENCOUNTER FOR PREPROCEDURE SCREENING LABORATORY TESTING FOR COVID-19: ICD-10-CM

## 2020-11-30 DIAGNOSIS — R93.89 ABNORMAL CT OF THE CHEST: ICD-10-CM

## 2020-11-30 DIAGNOSIS — R09.02 HYPOXIA: ICD-10-CM

## 2020-11-30 PROCEDURE — 99214 OFFICE O/P EST MOD 30 MIN: CPT | Performed by: NURSE PRACTITIONER

## 2020-11-30 PROCEDURE — 1111F DSCHRG MED/CURRENT MED MERGE: CPT | Performed by: NURSE PRACTITIONER

## 2020-11-30 NOTE — PROGRESS NOTES
Called patient with assistance of Language Line. Spoke with Zena Duong. Home Monitoring Condition Update    Covid19+ test date: 11/16/2020      Consent Verification:  Assessment Completed With: Patient  HIPAA Verified?   Yes    COVID-19 HOME MONITORING 11/ other household members when possible and stay completely isolated from the general public 3 days after symptoms resolve or 10 days total (whichever is longer). Advised patient If symptoms worsen/ medical emergency to call 911.       Time spent this encoun

## 2020-11-30 NOTE — PROGRESS NOTES
Subjective     HPI:   Pastor Whiting verbally consents to a Virtual/Telephone Check-In service on 11/30/20. Patient understands and accepts financial responsibility for any deductible, co-insurance and/or co-pays associated with this service.  This visit is TREADMILL STRESS, ADULT (CPT=93017); Future    Encounter for preprocedure screening laboratory testing for COVID-19  -     SARS-COV-2 BY PCR (); Future         Reviewed hospitalization stays. Discussed labs, CT chest and EKG with patient.    Discussed

## 2020-11-30 NOTE — PROGRESS NOTES
[facility] planned to contact patient for TCM, however, TCM-Hospital FU appointment was completed on 11/30/2020. [facility] closing encounter.

## 2020-11-30 NOTE — TELEPHONE ENCOUNTER
From: Danielle Lott  To: Marko Murphy MD  Sent: 11/30/2020 9:03 AM CST  Subject: Non-Urgent Medical Question    I need appointments virtual, for make questions.  Tanks

## 2020-12-01 ENCOUNTER — PATIENT OUTREACH (OUTPATIENT)
Dept: CASE MANAGEMENT | Age: 65
End: 2020-12-01

## 2020-12-01 NOTE — PAYOR COMM NOTE
--------------  DISCHARGE REVIEW    [facility]  Subscriber #:  [id number]  Authorization Number: Bethany Bashir    Admit date: 11/27/20  Admit time:  1386  Discharge Date: 11/28/2020  3:29 PM     Admitting Physician: Cristiano Means MD  Attending Physic Risk  0-28   Low Risk         TCM Follow-Up Recommendation:  LACE > 58: High Risk of readmission after discharge from the hospital.  **Certification    Admission date was 11/27/2020. Inpatient stay was shorter than expected.   Patient's Hypoxia was initial (36.9 °C)] 97.5 °F (36.4 °C)  Pulse:  [63-94] 94  Resp:  [14-22] 18  BP: ()/(57-74) 98/64     General:awake in no distress  Vital signs:Temp:  [97 °F (36.1 °C)-98.4 °F (36.9 °C)] 97.5 °F (36.4 °C)  Pulse:  [63-94] 94  Resp:  [14-22] 18  BP: ()/ infection           ASSESSMENT/PLAN:  1.  COVID-19 pneumonia day #18  Onset of symptoms 11/9, and tested positive on 11/16  -shortness of breath, mild hypoxia   02 weaned off, but needs with exertion  --now on 1L     CTA no PE/shows covid pneumonia  Continu

## 2020-12-01 NOTE — PROGRESS NOTES
Home Monitoring Condition Update    Covid19+ test date: 11/16/20      Consent Verification:  Assessment Completed With: Patient   Irvin Steele #[id number]   HIPAA Verified?   Yes    COVID-19 HOME MONITORING 12/1/2020   Temperature 98.2   Reading From resources  Total Time: 15  minutes

## 2020-12-01 NOTE — TELEPHONE ENCOUNTER
[place]oke to [place] in [facility] [place] COVID + on: 11/16/2020    Date of ONSET: 11/11-11/12    CURRENTLY:    Cough: Denies    SOB: Denies    SpO2: 96 % room air, now.     Headache: Denies    Fever: Denies    Body Ache: Denies    Fatigue: Denies    Nausea/Vomiting/

## 2020-12-01 NOTE — PAYOR COMM NOTE
--------------  ADMISSION REVIEW     Payor: [facility]  Subscriber #:  [id number]  Authorization Number: Kunal Lambert    Admit date: 11/27/20  Admit time: 1734       Admitting Physician: Lealnd Hu MD  Attending Physician:  No att. providers found  Prim Cardiovascular: Regular rate and rhythm, normal S1 and S2, no murmurs, rubs, or gallops   Lungs: Clear to auscultation bilaterally with equal breath sounds, no wheezing, no rhonchi   Abdomen: Positive bowel sounds,  soft, no tenderness  Extremities: No cya Resulted by: batch: K, PBNP, CRP, CK, LDH, CO2, CL, NA, ALB, TBIL, ALT, AST, ALP, CA, CREA, BUN, GLUC,           Resulted by: [id number]: FERR,    CK CREATINE KINASE (NOT CREATININE) - Normal   PROCALCITONIN - Normal    Narrative:     Resulted by: batch: CTNI Signed by Eric Smith MD on 11/27/2020  9:56 PM            H&P - H&P Note      H&P signed by Sonali Cornejo MD at 11/27/2020  3:10 PM     Author: Sonali Cornejo MD Service: [place] Author Type: Physician    Filed: 11/27/2020  3:10 PM Date of Service: 11/ Review of Systems:   A comprehensive 14 point review of systems was completed. Pertinent positives and negatives noted in the HPI.     Physical Exam:    BP 99/70   Pulse 72   Temp 97 °F (36.1 °C) (Temporal)   Resp 18   Ht 5' 7\" (1.702 m)   Wt 174 lb (78   --   --  88       Imaging: Imaging data reviewed in Epic. ASSESSMENT / PLAN:     1.  Acute hypoxic resp failure due to covid19 pneumonia-restart decadron; covid protocol; low threshold for ID or pulm  Will do med rec once review complete  Quali

## 2020-12-21 ENCOUNTER — HOSPITAL ENCOUNTER (OUTPATIENT)
Dept: CT IMAGING | Facility: HOSPITAL | Age: 65
Discharge: HOME | End: 2020-12-21
Attending: NURSE PRACTITIONER
Payer: COMMERCIAL

## 2020-12-21 DIAGNOSIS — R93.89 ABNORMAL CT OF THE CHEST: ICD-10-CM

## 2020-12-21 PROCEDURE — 71250 CT THORAX DX C-: CPT | Performed by: NURSE PRACTITIONER

## 2021-03-12 DIAGNOSIS — Z23 NEED FOR VACCINATION: ICD-10-CM

## 2021-03-27 ENCOUNTER — IMMUNIZATION (OUTPATIENT)
Dept: LAB | Age: 66
End: 2021-03-27
Attending: HOSPITALIST
Payer: COMMERCIAL

## 2021-03-27 DIAGNOSIS — Z23 NEED FOR VACCINATION: Primary | ICD-10-CM

## 2021-03-27 PROCEDURE — 0001A SARSCOV2 VAC 30MCG/0.3ML IM: CPT

## 2021-04-17 ENCOUNTER — IMMUNIZATION (OUTPATIENT)
Dept: LAB | Age: 66
End: 2021-04-17
Attending: HOSPITALIST
Payer: COMMERCIAL

## 2021-04-17 DIAGNOSIS — Z23 NEED FOR VACCINATION: Primary | ICD-10-CM

## 2021-04-17 PROCEDURE — 0002A SARSCOV2 VAC 30MCG/0.3ML IM: CPT

## 2022-01-03 ENCOUNTER — IMMUNIZATION (OUTPATIENT)
Dept: LAB | Facility: HOSPITAL | Age: 67
End: 2022-01-03
Attending: EMERGENCY MEDICINE
Payer: COMMERCIAL

## 2022-01-03 DIAGNOSIS — Z23 NEED FOR VACCINATION: Primary | ICD-10-CM

## 2022-01-03 PROCEDURE — 0004A SARSCOV2 VAC 30MCG/0.3ML IM: CPT | Performed by: NURSE PRACTITIONER

## 2022-03-14 ENCOUNTER — OFFICE VISIT (OUTPATIENT)
Dept: FAMILY MEDICINE CLINIC | Facility: CLINIC | Age: 67
End: 2022-03-14
Payer: COMMERCIAL

## 2022-03-14 VITALS
TEMPERATURE: 98 F | HEIGHT: 67 IN | WEIGHT: 180 LBS | HEART RATE: 60 BPM | SYSTOLIC BLOOD PRESSURE: 92 MMHG | RESPIRATION RATE: 16 BRPM | BODY MASS INDEX: 28.25 KG/M2 | DIASTOLIC BLOOD PRESSURE: 62 MMHG

## 2022-03-14 DIAGNOSIS — R07.81 RIB PAIN ON RIGHT SIDE: Primary | ICD-10-CM

## 2022-03-14 DIAGNOSIS — H57.12 LEFT EYE PAIN: ICD-10-CM

## 2022-03-14 DIAGNOSIS — I83.813 VARICOSE VEINS OF BOTH LOWER EXTREMITIES WITH PAIN: ICD-10-CM

## 2022-03-14 PROCEDURE — 3008F BODY MASS INDEX DOCD: CPT | Performed by: FAMILY MEDICINE

## 2022-03-14 PROCEDURE — 3074F SYST BP LT 130 MM HG: CPT | Performed by: FAMILY MEDICINE

## 2022-03-14 PROCEDURE — 99214 OFFICE O/P EST MOD 30 MIN: CPT | Performed by: FAMILY MEDICINE

## 2022-03-14 PROCEDURE — 3078F DIAST BP <80 MM HG: CPT | Performed by: FAMILY MEDICINE

## 2022-09-06 ENCOUNTER — ORDER TRANSCRIPTION (OUTPATIENT)
Dept: ADMINISTRATIVE | Facility: HOSPITAL | Age: 67
End: 2022-09-06

## 2022-09-06 DIAGNOSIS — I83.90 VARICOSE VEINS: Primary | ICD-10-CM

## 2022-10-21 ENCOUNTER — TELEPHONE (OUTPATIENT)
Dept: FAMILY MEDICINE CLINIC | Facility: CLINIC | Age: 67
End: 2022-10-21

## 2022-10-21 NOTE — TELEPHONE ENCOUNTER
Medical Records Request received from [id number] [facility] for Abhay for records from all records past 3 years. Release original sent to [employer] on 10/21/2022. Copy sent to scanning.

## 2023-09-13 ENCOUNTER — TELEPHONE (OUTPATIENT)
Dept: FAMILY MEDICINE CLINIC | Facility: CLINIC | Age: 68
End: 2023-09-13

## 2023-09-16 ENCOUNTER — OFFICE VISIT (OUTPATIENT)
Dept: FAMILY MEDICINE CLINIC | Facility: CLINIC | Age: 68
End: 2023-09-16
Payer: COMMERCIAL

## 2023-09-16 VITALS
HEART RATE: 76 BPM | WEIGHT: 177.38 LBS | TEMPERATURE: 98 F | BODY MASS INDEX: 27.84 KG/M2 | HEIGHT: 67 IN | SYSTOLIC BLOOD PRESSURE: 90 MMHG | DIASTOLIC BLOOD PRESSURE: 50 MMHG | RESPIRATION RATE: 16 BRPM

## 2023-09-16 DIAGNOSIS — R19.8 SYMPTOMS OF GASTROESOPHAGEAL REFLUX: Primary | ICD-10-CM

## 2023-09-16 DIAGNOSIS — M79.675 TOE PAIN, LEFT: ICD-10-CM

## 2023-09-16 DIAGNOSIS — I83.90 VARICOSE VEINS: ICD-10-CM

## 2023-09-16 DIAGNOSIS — Z13.89 SCREENING FOR GENITOURINARY CONDITION: ICD-10-CM

## 2023-09-16 DIAGNOSIS — Z00.00 LABORATORY EXAMINATION ORDERED AS PART OF A ROUTINE GENERAL MEDICAL EXAMINATION: ICD-10-CM

## 2023-09-16 DIAGNOSIS — Z12.5 SCREENING FOR PROSTATE CANCER: ICD-10-CM

## 2023-09-16 DIAGNOSIS — Z12.11 SCREENING FOR COLON CANCER: ICD-10-CM

## 2023-09-16 PROCEDURE — 3008F BODY MASS INDEX DOCD: CPT | Performed by: NURSE PRACTITIONER

## 2023-09-16 PROCEDURE — 3074F SYST BP LT 130 MM HG: CPT | Performed by: NURSE PRACTITIONER

## 2023-09-16 PROCEDURE — 3078F DIAST BP <80 MM HG: CPT | Performed by: NURSE PRACTITIONER

## 2023-09-16 PROCEDURE — 99214 OFFICE O/P EST MOD 30 MIN: CPT | Performed by: NURSE PRACTITIONER

## 2024-01-01 NOTE — TELEPHONE ENCOUNTER
[place]Speaking patient, I spoke to [place] COVID + on: 11/16/2020    Date of ONSET: about 11/11-11/12 [see chart review-->notes-->11/16/20]    Patient was recently re-admitted 11/27/2020;  Discharged 11/28/2020 [facility]. Discharge Dx No

## 2024-11-14 ENCOUNTER — OFFICE VISIT (OUTPATIENT)
Dept: FAMILY MEDICINE CLINIC | Facility: CLINIC | Age: 69
End: 2024-11-14
Payer: MEDICARE

## 2024-11-14 VITALS
RESPIRATION RATE: 16 BRPM | DIASTOLIC BLOOD PRESSURE: 56 MMHG | TEMPERATURE: 99 F | BODY MASS INDEX: 30.29 KG/M2 | WEIGHT: 193 LBS | HEIGHT: 67 IN | HEART RATE: 75 BPM | SYSTOLIC BLOOD PRESSURE: 98 MMHG

## 2024-11-14 DIAGNOSIS — Z00.00 HEALTHCARE MAINTENANCE: ICD-10-CM

## 2024-11-14 DIAGNOSIS — Z12.5 SCREENING FOR PROSTATE CANCER: ICD-10-CM

## 2024-11-14 DIAGNOSIS — Z12.11 SCREEN FOR COLON CANCER: ICD-10-CM

## 2024-11-14 DIAGNOSIS — H53.8 BLURRY VISION: ICD-10-CM

## 2024-11-14 DIAGNOSIS — Z00.00 MEDICARE ANNUAL WELLNESS VISIT, SUBSEQUENT: ICD-10-CM

## 2024-11-14 DIAGNOSIS — Z23 ENCOUNTER FOR IMMUNIZATION: ICD-10-CM

## 2024-11-15 ENCOUNTER — LAB ENCOUNTER (OUTPATIENT)
Dept: LAB | Age: 69
End: 2024-11-15
Attending: FAMILY MEDICINE
Payer: MEDICARE

## 2024-11-15 DIAGNOSIS — Z12.5 SCREENING FOR PROSTATE CANCER: ICD-10-CM

## 2024-11-15 DIAGNOSIS — Z00.00 HEALTHCARE MAINTENANCE: ICD-10-CM

## 2024-11-15 LAB
ALBUMIN SERPL-MCNC: 4.3 G/DL (ref 3.2–4.8)
ALBUMIN/GLOB SERPL: 1.3 {RATIO} (ref 1–2)
ALP LIVER SERPL-CCNC: 98 U/L
ALT SERPL-CCNC: 19 U/L
ANION GAP SERPL CALC-SCNC: 5 MMOL/L (ref 0–18)
AST SERPL-CCNC: 20 U/L (ref ?–34)
BASOPHILS # BLD AUTO: 0.06 X10(3) UL (ref 0–0.2)
BASOPHILS NFR BLD AUTO: 0.5 %
BILIRUB SERPL-MCNC: 0.8 MG/DL (ref 0.2–1.1)
BUN BLD-MCNC: 18 MG/DL (ref 9–23)
CALCIUM BLD-MCNC: 10 MG/DL (ref 8.7–10.4)
CHLORIDE SERPL-SCNC: 109 MMOL/L (ref 98–112)
CHOLEST SERPL-MCNC: 159 MG/DL (ref ?–200)
CO2 SERPL-SCNC: 26 MMOL/L (ref 21–32)
COMPLEXED PSA SERPL-MCNC: 1.39 NG/ML (ref ?–4)
CREAT BLD-MCNC: 1 MG/DL
EGFRCR SERPLBLD CKD-EPI 2021: 81 ML/MIN/1.73M2 (ref 60–?)
EOSINOPHIL # BLD AUTO: 1 X10(3) UL (ref 0–0.7)
EOSINOPHIL NFR BLD AUTO: 8.6 %
ERYTHROCYTE [DISTWIDTH] IN BLOOD BY AUTOMATED COUNT: 13.5 %
FASTING PATIENT LIPID ANSWER: YES
FASTING STATUS PATIENT QL REPORTED: YES
GLOBULIN PLAS-MCNC: 3.4 G/DL (ref 2–3.5)
GLUCOSE BLD-MCNC: 96 MG/DL (ref 70–99)
HCT VFR BLD AUTO: 44.4 %
HDLC SERPL-MCNC: 38 MG/DL (ref 40–59)
HGB BLD-MCNC: 14.7 G/DL
IMM GRANULOCYTES # BLD AUTO: 0.03 X10(3) UL (ref 0–1)
IMM GRANULOCYTES NFR BLD: 0.3 %
LDLC SERPL CALC-MCNC: 104 MG/DL (ref ?–100)
LYMPHOCYTES # BLD AUTO: 1.24 X10(3) UL (ref 1–4)
LYMPHOCYTES NFR BLD AUTO: 10.7 %
MCH RBC QN AUTO: 30.8 PG (ref 26–34)
MCHC RBC AUTO-ENTMCNC: 33.1 G/DL (ref 31–37)
MCV RBC AUTO: 92.9 FL
MONOCYTES # BLD AUTO: 0.92 X10(3) UL (ref 0.1–1)
MONOCYTES NFR BLD AUTO: 7.9 %
NEUTROPHILS # BLD AUTO: 8.33 X10 (3) UL (ref 1.5–7.7)
NEUTROPHILS # BLD AUTO: 8.33 X10(3) UL (ref 1.5–7.7)
NEUTROPHILS NFR BLD AUTO: 72 %
NONHDLC SERPL-MCNC: 121 MG/DL (ref ?–130)
OSMOLALITY SERPL CALC.SUM OF ELEC: 292 MOSM/KG (ref 275–295)
PLATELET # BLD AUTO: 209 10(3)UL (ref 150–450)
POTASSIUM SERPL-SCNC: 3.6 MMOL/L (ref 3.5–5.1)
PROT SERPL-MCNC: 7.7 G/DL (ref 5.7–8.2)
RBC # BLD AUTO: 4.78 X10(6)UL
SODIUM SERPL-SCNC: 140 MMOL/L (ref 136–145)
TRIGL SERPL-MCNC: 91 MG/DL (ref 30–149)
TSI SER-ACNC: 2.63 UIU/ML (ref 0.55–4.78)
VLDLC SERPL CALC-MCNC: 15 MG/DL (ref 0–30)
WBC # BLD AUTO: 11.6 X10(3) UL (ref 4–11)

## 2024-11-15 PROCEDURE — 36415 COLL VENOUS BLD VENIPUNCTURE: CPT

## 2024-11-15 PROCEDURE — 80061 LIPID PANEL: CPT

## 2024-11-15 PROCEDURE — 80053 COMPREHEN METABOLIC PANEL: CPT

## 2024-11-15 PROCEDURE — 84443 ASSAY THYROID STIM HORMONE: CPT

## 2024-11-15 PROCEDURE — 85025 COMPLETE CBC W/AUTO DIFF WBC: CPT

## 2024-11-15 NOTE — PROGRESS NOTES
Subjective:   Brady Wilkerson is a 69 year old male who presents for a MA AHA (Medicare Advantage Annual Health Assessment) and Initial Annual Wellness Visit (outside the first 12 months of Medicare eligibility, no prior AWV) and scheduled follow up of multiple significant but stable problems.     Patient does not have any past medical history at this time.  He does state that he has been having some blurry vision as of late he has had a history of cataract surgery in the past      History/Other:   Fall Risk Assessment:   He has been screened for Falls and is low risk.      Cognitive Assessment:   Abnormal  What day of the week is this?: Correct  What month is it?: Correct  What year is it?: Correct  Recall \"Ball\": Incorrect  Recall \"Flag\": Correct  Recall \"Tree\": Incorrect    Functional Ability/Status:   Brady Wilkerson has a completely normal functional assessment. See flowsheet for details.      Depression Screening (PHQ):  PHQ-2 SCORE: 0  , done 11/14/2024         Advanced Directives:   He does NOT have a Living Will. [Do you have a living will?: No]  He does NOT have a Power of  for Health Care. [Do you have a healthcare power of ?: No]  Discussed Advance Care Planning with patient (and family/surrogate if present). Standard forms made available to patient in After Visit Summary.      Patient Active Problem List   Diagnosis    GERD (gastroesophageal reflux disease)    Pterygium eye, bilateral    Nuclear sclerotic cataract of both eyes    COVID-19    Hypoxia    Hypokalemia    COVID-19 virus infection     Allergies:  He has No Known Allergies.    Current Medications:  No outpatient medications have been marked as taking for the 11/14/24 encounter (Office Visit) with Preston Otero MD.       Medical History:  He  has a past medical history of Vision problem.  Surgical History:  He  has a past surgical history that includes colonoscopy & polypectomy (12/14); upper gi endoscopy,biopsy (N/A,  12/11/2014); and colonoscopy,biopsy (N/A, 12/11/2014).   Family History:  His family history includes Asthma in his mother.  Social History:  He  reports that he has never smoked. He has never used smokeless tobacco. He reports current alcohol use. He reports that he does not use drugs.    Tobacco:  He has never smoked tobacco.    CAGE Alcohol Screen:   CAGE screening score of 0 on 11/14/2024, showing low risk of alcohol abuse.      Patient Care Team:  Phyllis Mcarthur MD as PCP - General (Family Medicine)  Nancie Diop APRN (Nurse Practitioner)    Review of Systems  Consitutional: No fevers, chills, sweats  Eye: No recent visual problems  ENMT: No ear pain nasal congestion sore throat  Respiratory: No shortness of breath, cough  Cardiovascular: No chest pain palpitations syncope  Gastrointestinal: No nausea vomiting diarrhea  Genitourinary: No hematuria  Hema/Lymph no bruising tendency, swollen lymph glands  Endocrine: Negative for excessive thirst excessive hunger  Musculoskeletal: No back pain neck pain joint pain muscle pain decreased range of motion  Integumentary: No rash, pruritus, abrasions  Neurologic: Alert and oriented x4  Psychiatric: No anxiety, depression    Medical, surgical, family, and social histories were reviewed      Objective:   Physical Exam    VITALS: Vitals reviewed   GENERAL: well developed, well nourished, in no apparent distress  SKIN: no rashes, no suspicious lesions: Cool and Dry  HEENT: atraumatic, normocephalic, ears and throat are clear bilateral tympanic membranes lucent nonbulging intact nose without bleeding purulent discharge or septal hematoma.    EYES: Pupils equal round and reactive.  Extraocular motions intact no scleral icterus no injection or drainage  THROAT without erythema tonsillar hypertrophy or exudate.  Uvula midline airway patent                Hearing Assessed via: Whispered Voice  NECK: trachea midline.  No JVD or lymphadenopathy supple nontender no  meningeal signs   LUNGS: clear to auscultation sounds equal bilaterally no wheezes rales or rhonchi  CARDIO: Regular rate and rhythm without murmurs gallops or rubs  GI: Soft nontender nondistended no hepatomegaly palpable masses.  No guarding.   without deformity or crepitus no flank tenderness  EXTREMITIES: no cyanosis, clubbing or edema no joint tenderness effusion or edema noted.  No calf tenderness negative Homans' sign bilaterally  NEURO: Awake and alert.  Cranial nerves II through XII intact motor and sensory grossly within normal limits.  5 out of 5 muscle strength in all muscle groups.  Normal speech.  PSYCHIATRIC: Awake, alert and oriented x3, cooperative appropriate mood and affect.  Judgment intact              BP 98/56 (BP Location: Left arm, Patient Position: Sitting, Cuff Size: adult)   Pulse 75   Temp 98.9 °F (37.2 °C) (Temporal)   Resp 16   Ht 5' 7\" (1.702 m)   Wt 193 lb (87.5 kg)   BMI 30.23 kg/m²  Estimated body mass index is 30.23 kg/m² as calculated from the following:    Height as of this encounter: 5' 7\" (1.702 m).    Weight as of this encounter: 193 lb (87.5 kg).    Medicare Hearing Assessment:   Hearing Screening    Time taken: 11/14/2024 12:28 PM  Entry User: Ching Swanson MA  Screening Method: Finger Rub  Finger Rub Result: Pass         Visual Acuity:   Right Eye Visual Acuity: Uncorrected Right Eye Chart Acuity: 20/200   Left Eye Visual Acuity: Uncorrected Left Eye Chart Acuity: 20/200   Both Eyes Visual Acuity: Uncorrected Both Eyes Chart Acuity: 20/200   Able To Tolerate Visual Acuity: No        Assessment & Plan:   Brady Wilkerson is a 69 year old male who presents for a Medicare Assessment.     1. Medicare annual wellness visit, subsequent  I did discuss with the patient would suggest for him to update his blood work needed fasting lipid panel CBC CMP free T4 free T3 TSH as well as PSA  I did also discuss her like to update his Prevnar 20 vaccine today.  Will also  be sending in a Cologuard for him to complete.  He was asked to follow-up yearly for regular physical exams.      2. Blurry vision  -     Ophthalmology Referral - In Network    Patient is having some episodes of blurry vision as well as a history of cataract removal in the past I did discuss with ahead and refer him to ophthalmology      3. Healthcare maintenance  -     Comp Metabolic Panel (14); Future; Expected date: 11/14/2024  -     Lipid Panel; Future; Expected date: 11/14/2024  -     TSH W Reflex To Free T4; Future; Expected date: 11/14/2024  -     CBC With Differential With Platelet; Future; Expected date: 11/14/2024  4. Screening for prostate cancer  -     PSA Total, Screen; Future; Expected date: 11/14/2024  5. Screen for colon cancer  -     COLOGUARD COLON CANCER SCREENING (EXTERNAL)  6. Encounter for immunization  -     PCV20 VACCINE FOR INTRAMUSCULAR USE      The patient presented today for Medicare annual wellness visit.  During the course of today's visit the health risk assessment past medical family and social histories were updated and reviewed with the patient.  The patient was educated and counseled about appropriate screening and preventative services and these were ordered as appropriate.  Referrals for educational and counseling services were made where indicated.  Advance directives were discussed.  A copy of the recommended preventative screenings as well as discharge instructions were provided to the patient.  End-of-life planning was discussed advanced directives were also discussed and are in place.          The patient indicates understanding of these issues and agrees to the plan.  Consult ordered.  Lab work ordered.  Patient reassured.  Reinforced healthy diet, lifestyle, and exercise.      No follow-ups on file.     Preston Otero MD, 11/15/2024     Supplementary Documentation:   General Health:  In the past six months, have you lost more than 10 pounds without trying?: 2 - No  Has  your appetite been poor?: No  Type of Diet: Balanced  How does the patient maintain a good energy level?: Appropriate Exercise;Daily Walks (1-2 days a week)  How would you describe your daily physical activity?: Light  How would you describe your current health state?: Fair  How do you maintain positive mental well-being?: Visiting Family;Visiting Friends;Social Interaction  On a scale of 0 to 10, with 0 being no pain and 10 being severe pain, what is your pain level?: 1 - (Mild) (burning in his big toe)  In the past six months, have you experienced urine leakage?: 0-No  At any time do you feel concerned for the safety/well-being of yourself and/or your children, in your home or elsewhere?: No  Have you had any immunizations at another office such as Influenza, Hepatitis B, Tetanus, or Pneumococcal?: No    Health Maintenance   Topic Date Due    PSA  05/26/2018    Colorectal Cancer Screening  12/11/2019    MA Annual Health Assessment  Never done    COVID-19 Vaccine (5 - 2024-25 season) 09/01/2024    Influenza Vaccine (1) Never done    Zoster Vaccines (2 of 3) 12/14/2024 (Originally 4/2/2015)    Annual Depression Screening  Completed    Fall Risk Screening (Annual)  Completed    Pneumococcal Vaccine: 65+ Years  Completed

## 2025-01-02 ENCOUNTER — MED REC SCAN ONLY (OUTPATIENT)
Dept: FAMILY MEDICINE CLINIC | Facility: CLINIC | Age: 70
End: 2025-01-02

## 2025-01-06 PROBLEM — H31.102: Status: ACTIVE | Noted: 2025-01-06

## 2025-01-06 PROBLEM — H26.493 POSTERIOR CAPSULAR OPACIFICATION NON VISUALLY SIGNIFICANT OF BOTH EYES: Status: ACTIVE | Noted: 2025-01-06

## 2025-01-06 PROBLEM — H35.371 MACULAR PUCKER, RIGHT EYE: Status: ACTIVE | Noted: 2025-01-06

## 2025-01-06 PROBLEM — H26.491 OTHER SECONDARY CATARACT, RIGHT EYE: Status: ACTIVE | Noted: 2025-01-06

## 2025-01-06 PROBLEM — I83.813 VARICOSE VEINS OF BOTH LOWER EXTREMITIES WITH PAIN: Status: ACTIVE | Noted: 2022-03-22

## 2025-01-06 PROBLEM — R07.81 RIB PAIN ON RIGHT SIDE: Status: ACTIVE | Noted: 2022-03-22

## 2025-01-06 PROBLEM — H35.722 MACULAR PIGMENT EPITHELIAL DETACHMENT OF LEFT EYE: Status: ACTIVE | Noted: 2025-01-06
